# Patient Record
Sex: MALE | Race: BLACK OR AFRICAN AMERICAN
[De-identification: names, ages, dates, MRNs, and addresses within clinical notes are randomized per-mention and may not be internally consistent; named-entity substitution may affect disease eponyms.]

---

## 2018-02-25 ENCOUNTER — HOSPITAL ENCOUNTER (EMERGENCY)
Dept: HOSPITAL 17 - PHEFT | Age: 33
Discharge: HOME | End: 2018-02-25
Payer: SELF-PAY

## 2018-02-25 VITALS — HEIGHT: 66 IN | BODY MASS INDEX: 31.89 KG/M2 | WEIGHT: 198.42 LBS

## 2018-02-25 VITALS
DIASTOLIC BLOOD PRESSURE: 88 MMHG | TEMPERATURE: 98.2 F | HEART RATE: 78 BPM | OXYGEN SATURATION: 98 % | SYSTOLIC BLOOD PRESSURE: 177 MMHG | RESPIRATION RATE: 16 BRPM

## 2018-02-25 DIAGNOSIS — Z72.0: ICD-10-CM

## 2018-02-25 DIAGNOSIS — L53.9: Primary | ICD-10-CM

## 2018-02-25 PROCEDURE — 99283 EMERGENCY DEPT VISIT LOW MDM: CPT
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
## 2018-05-29 ENCOUNTER — HOSPITAL ENCOUNTER (EMERGENCY)
Dept: HOSPITAL 17 - NEPD | Age: 33
Discharge: LEFT BEFORE DISCHARGE | End: 2018-05-29
Payer: SELF-PAY

## 2018-05-29 VITALS
TEMPERATURE: 98.8 F | HEART RATE: 102 BPM | OXYGEN SATURATION: 99 % | RESPIRATION RATE: 18 BRPM | DIASTOLIC BLOOD PRESSURE: 102 MMHG | SYSTOLIC BLOOD PRESSURE: 170 MMHG

## 2018-05-29 DIAGNOSIS — N50.811: Primary | ICD-10-CM

## 2018-05-29 DIAGNOSIS — F17.200: ICD-10-CM

## 2018-05-29 PROCEDURE — 99281 EMR DPT VST MAYX REQ PHY/QHP: CPT
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
## 2018-06-01 ENCOUNTER — HOSPITAL ENCOUNTER (EMERGENCY)
Dept: HOSPITAL 17 - NEPE | Age: 33
Discharge: HOME | End: 2018-06-01
Payer: SELF-PAY

## 2018-06-01 VITALS — HEIGHT: 66 IN | BODY MASS INDEX: 25.51 KG/M2 | WEIGHT: 158.73 LBS

## 2018-06-01 VITALS
RESPIRATION RATE: 20 BRPM | OXYGEN SATURATION: 97 % | HEART RATE: 78 BPM | DIASTOLIC BLOOD PRESSURE: 65 MMHG | SYSTOLIC BLOOD PRESSURE: 137 MMHG | TEMPERATURE: 98.9 F

## 2018-06-01 DIAGNOSIS — A49.01: ICD-10-CM

## 2018-06-01 DIAGNOSIS — L02.511: Primary | ICD-10-CM

## 2018-06-01 DIAGNOSIS — L03.113: ICD-10-CM

## 2018-06-01 DIAGNOSIS — F17.200: ICD-10-CM

## 2018-06-01 LAB
BASOPHILS # BLD AUTO: 0.1 TH/MM3 (ref 0–0.2)
BASOPHILS NFR BLD: 0.5 % (ref 0–2)
EOSINOPHIL # BLD: 0.2 TH/MM3 (ref 0–0.4)
EOSINOPHIL NFR BLD: 1.1 % (ref 0–4)
ERYTHROCYTE [DISTWIDTH] IN BLOOD BY AUTOMATED COUNT: 12.4 % (ref 11.6–17.2)
HCT VFR BLD CALC: 37.8 % (ref 39–51)
HGB BLD-MCNC: 12.9 GM/DL (ref 13–17)
LYMPHOCYTES # BLD AUTO: 2.3 TH/MM3 (ref 1–4.8)
LYMPHOCYTES NFR BLD AUTO: 11.3 % (ref 9–44)
MCH RBC QN AUTO: 29 PG (ref 27–34)
MCHC RBC AUTO-ENTMCNC: 34.2 % (ref 32–36)
MCV RBC AUTO: 84.7 FL (ref 80–100)
MONOCYTE #: 1.6 TH/MM3 (ref 0–0.9)
MONOCYTES NFR BLD: 8.2 % (ref 0–8)
NEUTROPHILS # BLD AUTO: 15.8 TH/MM3 (ref 1.8–7.7)
NEUTROPHILS NFR BLD AUTO: 78.9 % (ref 16–70)
PLATELET # BLD: 388 TH/MM3 (ref 150–450)
PMV BLD AUTO: 8.4 FL (ref 7–11)
RBC # BLD AUTO: 4.46 MIL/MM3 (ref 4.5–5.9)
WBC # BLD AUTO: 20 TH/MM3 (ref 4–11)

## 2018-06-01 PROCEDURE — 73120 X-RAY EXAM OF HAND: CPT

## 2018-06-01 PROCEDURE — 87070 CULTURE OTHR SPECIMN AEROBIC: CPT

## 2018-06-01 PROCEDURE — 10060 I&D ABSCESS SIMPLE/SINGLE: CPT

## 2018-06-01 PROCEDURE — 85025 COMPLETE CBC W/AUTO DIFF WBC: CPT

## 2018-06-01 PROCEDURE — 87205 SMEAR GRAM STAIN: CPT

## 2018-06-01 PROCEDURE — 87186 SC STD MICRODIL/AGAR DIL: CPT

## 2018-06-01 PROCEDURE — 86403 PARTICLE AGGLUT ANTBDY SCRN: CPT

## 2018-06-01 NOTE — RADRPT
EXAM DATE:  6/1/2018 4:02 PM EDT

AGE/SEX:        32 years / Male



INDICATIONS:  Right hand, thumb pain, no injury.



CLINICAL DATA:  This is the patient's initial encounter. Patient reports that signs and symptoms have
 been present for 1 day and indicates a pain score of 6/10. 

                                                                          

MEDICAL/SURGICAL HISTORY:       None. None.



COMPARISON:      No prior [facility] exams available for comparison.



FINDINGS:  

AP and lateral views of right hand were obtained and demonstrate extensive soft tissue swelling over 
the dorsum of the hand with no acute fracture or malalignment. Joint spaces are preserved and there i
s no destructive change. No radiopaque foreign bodies identified.



CONCLUSION: 

Extensive soft tissue swelling with no radiopaque foreign body or underlying bony abnormality on this
 2 view study.









Electronically signed by: Wesley Johnson MD  6/1/2018 4:06 PM EDT
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
